# Patient Record
Sex: MALE | Race: WHITE | NOT HISPANIC OR LATINO | Employment: UNEMPLOYED | URBAN - NONMETROPOLITAN AREA
[De-identification: names, ages, dates, MRNs, and addresses within clinical notes are randomized per-mention and may not be internally consistent; named-entity substitution may affect disease eponyms.]

---

## 2018-10-13 PROCEDURE — 96372 THER/PROPH/DIAG INJ SC/IM: CPT

## 2018-10-13 PROCEDURE — 99283 EMERGENCY DEPT VISIT LOW MDM: CPT

## 2018-10-14 ENCOUNTER — HOSPITAL ENCOUNTER (EMERGENCY)
Facility: HOSPITAL | Age: 18
Discharge: HOME | End: 2018-10-14
Admitting: EMERGENCY MEDICINE

## 2018-10-14 VITALS
DIASTOLIC BLOOD PRESSURE: 76 MMHG | HEART RATE: 67 BPM | TEMPERATURE: 97.4 F | BODY MASS INDEX: 27.46 KG/M2 | RESPIRATION RATE: 16 BRPM | SYSTOLIC BLOOD PRESSURE: 132 MMHG | WEIGHT: 155 LBS | OXYGEN SATURATION: 100 % | HEIGHT: 63 IN

## 2018-10-14 DIAGNOSIS — L03.317 CELLULITIS AND ABSCESS OF BUTTOCK: ICD-10-CM

## 2018-10-14 DIAGNOSIS — L02.31 CELLULITIS AND ABSCESS OF BUTTOCK: ICD-10-CM

## 2018-10-14 DIAGNOSIS — Z20.2 POSSIBLE EXPOSURE TO STD: Primary | ICD-10-CM

## 2018-10-14 LAB
BILIRUB UR QL STRIP: NEGATIVE
CLARITY UR: CLEAR
COLOR UR: YELLOW
GLUCOSE UR STRIP-MCNC: NEGATIVE MG/DL
HGB UR QL STRIP.AUTO: NEGATIVE
KETONES UR QL STRIP: NEGATIVE
LEUKOCYTE ESTERASE UR QL STRIP.AUTO: NEGATIVE
NITRITE UR QL STRIP: NEGATIVE
PH UR STRIP.AUTO: 6.5 [PH] (ref 5–8)
PROT UR QL STRIP: NEGATIVE
SP GR UR STRIP: 1.02 (ref 1–1.03)
UROBILINOGEN UR QL STRIP: NORMAL

## 2018-10-14 PROCEDURE — 96372 THER/PROPH/DIAG INJ SC/IM: CPT

## 2018-10-14 PROCEDURE — 87591 N.GONORRHOEAE DNA AMP PROB: CPT | Performed by: PHYSICIAN ASSISTANT

## 2018-10-14 PROCEDURE — 87491 CHLMYD TRACH DNA AMP PROBE: CPT | Performed by: PHYSICIAN ASSISTANT

## 2018-10-14 PROCEDURE — 25010000002 CEFTRIAXONE PER 250 MG: Performed by: PHYSICIAN ASSISTANT

## 2018-10-14 PROCEDURE — 81003 URINALYSIS AUTO W/O SCOPE: CPT | Performed by: PHYSICIAN ASSISTANT

## 2018-10-14 RX ORDER — AZITHROMYCIN 250 MG/1
1000 TABLET, FILM COATED ORAL ONCE
Status: COMPLETED | OUTPATIENT
Start: 2018-10-14 | End: 2018-10-14

## 2018-10-14 RX ORDER — CEPHALEXIN 500 MG/1
500 CAPSULE ORAL 2 TIMES DAILY
Qty: 14 CAPSULE | Refills: 0 | Status: SHIPPED | OUTPATIENT
Start: 2018-10-14 | End: 2018-10-21

## 2018-10-14 RX ADMIN — LIDOCAINE HYDROCHLORIDE 1000 MG: 10 INJECTION, SOLUTION INFILTRATION; PERINEURAL at 01:46

## 2018-10-14 RX ADMIN — AZITHROMYCIN 1000 MG: 250 TABLET, FILM COATED ORAL at 01:43

## 2018-10-14 NOTE — ED PROVIDER NOTES
"Subjective   18-year-old male presents to the emergency department with dysuria, lesion to right buttocks.  Patient reports that he has had dysuria described as \"razor blade burning while I pedis\" for approximately 10 days.  Patient denies fever, chills, penile discharge.  However patient does state that he has had possible STD exposure due to unprotected sex.  Patient requesting retesting her SCDs.  Patient also states that he has a skin lesion on his right buttock area.  Patient reports that that has been present for 2-3 days.  She denies drainage, red streaking however states the area is tender and has slightly grown since onset.  Patient also complains of this time.            Review of Systems   Constitutional: Negative for chills, diaphoresis, fatigue and fever.   HENT: Negative for congestion and trouble swallowing.    Respiratory: Negative for shortness of breath and wheezing.    Cardiovascular: Negative for chest pain and palpitations.   Gastrointestinal: Negative for abdominal pain, diarrhea and nausea.   Genitourinary: Positive for dysuria.   Musculoskeletal: Negative for arthralgias and myalgias.   Skin: Positive for rash.   Neurological: Negative for dizziness and numbness.   Hematological: Negative for adenopathy. Does not bruise/bleed easily.       Past Medical History:   Diagnosis Date   • Concussion        No Known Allergies    History reviewed. No pertinent surgical history.    Family History   Problem Relation Age of Onset   • Diabetes Mother    • Kidney disease Mother    • Hypertension Mother    • Diabetes Maternal Aunt    • Hypertension Maternal Aunt    • Diabetes Maternal Grandmother    • Hypertension Maternal Grandmother        Social History     Social History   • Marital status: Single     Social History Main Topics   • Smoking status: Current Every Day Smoker     Types: Cigarettes   • Alcohol use No   • Drug use: Unknown     Other Topics Concern   • Not on file       Lab Results (last 24 " hours)     Procedure Component Value Units Date/Time    Urinalysis With Microscopic If Indicated (No Culture) - Urine, Clean Catch [[id number]]  (Normal) Collected:  10/14/18 0106    Specimen:  Urine from Urine, Clean Catch Updated:  10/14/18 0118     Color, UA Yellow     Appearance, UA Clear     pH, UA 6.5     Specific Gravity, UA 1.017     Glucose, UA Negative     Ketones, UA Negative     Bilirubin, UA Negative     Blood, UA Negative     Protein, UA Negative     Leuk Esterase, UA Negative     Nitrite, UA Negative     Urobilinogen, UA 0.2 E.U./dL    Narrative:       Urine microscopic not indicated.    Chlamydia trachomatis, Neisseria gonorrhoeae, PCR - Urine, Urine, Clean Catch [[id number]] Collected:  10/14/18 0106    Specimen:  Urine from Urine, Clean Catch Updated:  10/14/18 0112          Objective   Physical Exam   Constitutional: He is oriented to person, place, and time. He appears well-developed and well-nourished. No distress.   HENT:   Head: Normocephalic and atraumatic.   Right Ear: External ear normal.   Left Ear: External ear normal.   Eyes: Pupils are equal, round, and reactive to light. Conjunctivae and EOM are normal. Right eye exhibits no discharge. Left eye exhibits no discharge. No scleral icterus.   Neck: Normal range of motion. Neck supple. No tracheal deviation present. No thyromegaly present.   Cardiovascular: Normal rate, regular rhythm, normal heart sounds and intact distal pulses.  Exam reveals no friction rub.    No murmur heard.  Pulmonary/Chest: Effort normal and breath sounds normal. No respiratory distress. He has no wheezes.   Abdominal: Soft. Bowel sounds are normal. He exhibits no distension. There is no tenderness. There is no guarding.   Genitourinary: Testes normal and penis normal. Cremasteric reflex is present. Right testis shows no mass, no swelling and no tenderness. Left testis shows no mass, no swelling and no tenderness. Circumcised. No phimosis, paraphimosis, hypospadias,  "penile erythema or penile tenderness. No discharge found.   Genitourinary Comments: On penile exam accompanied by nurse.  No obvious discharge normal meatus and urethral opening, no lesions to the shaft or the testicles.  He does have razor burn to the pubic area.   Neurological: He is alert and oriented to person, place, and time.   Skin: Skin is warm and dry. Capillary refill takes less than 2 seconds. He is not diaphoretic.   Patient with small area of cellulitis on the right buttocks.  Partially one by 2 cm area well to the right of the gluteal cleft.  No abscess or areas of fluctuance or induration.  No red streaking.  Mild in nature.   Psychiatric: He has a normal mood and affect. His behavior is normal.   Nursing note and vitals reviewed.      Procedures         No orders to display       /76   Pulse 67   Temp 97.4 °F (36.3 °C) (Temporal Artery )   Resp 16   Ht 160 cm (63\")   Wt 70.3 kg (155 lb)   SpO2 100%   BMI 27.46 kg/m²     ED Course         Medications   cefTRIAXone (ROCEPHIN) 1,000 mg in lidocaine (XYLOCAINE) 1 % IM only syringe (1,000 mg Intramuscular Given 10/14/18 0146)   azithromycin (ZITHROMAX) tablet 1,000 mg (1,000 mg Oral Given 10/14/18 0143)            MDM  Number of Diagnoses or Management Options  Cellulitis and abscess of buttock: new and requires workup  Possible exposure to STD: new and requires workup  Diagnosis management comments: Patient with cellulitis and possible STD exposure patient will be treated prophylactically with ceftriaxone and azithromycin.  We will send home on Keflex for mildd subtle cellulitis on buttocks.  Patient is no acute distress, with normal vital signs.  Patient instructed to refrain from sexual intercourse until all symptoms have resolved and we will contact him if test results from PCR chlamydia, or gonorrhea are positive.le to plan.  All results discussed in all questions answered.  Strict return precautions to which patient is agreeable to.     "   Amount and/or Complexity of Data Reviewed  Clinical lab tests: reviewed and ordered    Risk of Complications, Morbidity, and/or Mortality  Presenting problems: low  Diagnostic procedures: low  Management options: low    Patient Progress  Patient progress: stable      Final diagnoses:   Possible exposure to STD   Cellulitis and abscess of buttock          Patel Joe PA-C  10/14/18 0321

## 2018-10-14 NOTE — DISCHARGE INSTRUCTIONS
Please take the antibiotics as prescribed for the full course of duration.  Please refrain from sexual intercourse until all symptoms have cleared.  Return to the emergency department if he developed any new, worsening or other concerning symptoms.    Follow up with one of the [facility] physician groups below to setup primary care. If you have trouble following up, please call the [facility] Nurse Line at [phone]    (Dr. Arik Rivera DO,  LATOYA Magana, and LATOYA Montano)  [facility], Primary Care   [address] [place] [address], [address], [place] [address] [phone]     (Dr. Martha Torres MD, LATOYA Dela Cruz, and LATOYA Ahmadi)  [facility], Primary Care   [address] [place] [id number] [phone]    (Dr. Ovi Reddy MD and Dr. Maximilian Bravo MD)  [facility], Primary Care  [address], [id number] [phone]    (Dr. Yahir Lanier MD)  [facility], Primary Care  [address], [place], [place], [address] [phone]

## 2018-10-17 LAB
C TRACH RRNA SPEC DONR QL NAA+PROBE: NEGATIVE
N GONORRHOEA DNA SPEC QL NAA+PROBE: NEGATIVE

## 2025-04-17 ENCOUNTER — OFFICE VISIT (OUTPATIENT)
Age: 25
End: 2025-04-17

## 2025-04-17 VITALS — HEIGHT: 65 IN | WEIGHT: 207.8 LBS | BODY MASS INDEX: 34.62 KG/M2

## 2025-04-17 DIAGNOSIS — M67.911 TENDINOPATHY OF RIGHT ROTATOR CUFF: ICD-10-CM

## 2025-04-17 DIAGNOSIS — M75.21 BICEPS TENDINITIS, RIGHT: ICD-10-CM

## 2025-04-17 DIAGNOSIS — M25.511 RIGHT SHOULDER PAIN, UNSPECIFIED CHRONICITY: Primary | ICD-10-CM

## 2025-04-17 RX ORDER — NABUMETONE 500 MG/1
500 TABLET, FILM COATED ORAL 2 TIMES DAILY
Qty: 60 TABLET | Refills: 0 | Status: SHIPPED | OUTPATIENT
Start: 2025-04-17

## 2025-04-17 RX ORDER — BETAMETHASONE SODIUM PHOSPHATE AND BETAMETHASONE ACETATE 3; 3 MG/ML; MG/ML
6 INJECTION, SUSPENSION INTRA-ARTICULAR; INTRALESIONAL; INTRAMUSCULAR; SOFT TISSUE ONCE
Status: COMPLETED | OUTPATIENT
Start: 2025-04-17 | End: 2025-04-17

## 2025-04-17 RX ORDER — LIDOCAINE HYDROCHLORIDE 10 MG/ML
1 INJECTION, SOLUTION INFILTRATION; PERINEURAL ONCE
Status: COMPLETED | OUTPATIENT
Start: 2025-04-17 | End: 2025-04-17

## 2025-04-17 RX ORDER — BUPIVACAINE HYDROCHLORIDE 5 MG/ML
3 INJECTION, SOLUTION EPIDURAL; INTRACAUDAL; PERINEURAL ONCE
Status: COMPLETED | OUTPATIENT
Start: 2025-04-17 | End: 2025-04-17

## 2025-04-17 RX ADMIN — BUPIVACAINE HYDROCHLORIDE 15 MG: 5 INJECTION, SOLUTION EPIDURAL; INTRACAUDAL; PERINEURAL at 15:54

## 2025-04-17 RX ADMIN — LIDOCAINE HYDROCHLORIDE 1 ML: 10 INJECTION, SOLUTION INFILTRATION; PERINEURAL at 15:55

## 2025-04-17 RX ADMIN — BETAMETHASONE SODIUM PHOSPHATE AND BETAMETHASONE ACETATE 6 MG: 3; 3 INJECTION, SUSPENSION INTRA-ARTICULAR; INTRALESIONAL; INTRAMUSCULAR; SOFT TISSUE at 15:53

## 2025-04-17 RX ADMIN — LIDOCAINE HYDROCHLORIDE 1 ML: 10 INJECTION, SOLUTION INFILTRATION; PERINEURAL at 15:54

## 2025-04-17 NOTE — PROGRESS NOTES
Orthopaedic History and Physical - New Patient    NAME:  Jay Amin   : 2000  MRN: [id number]      2025     CHIEF COMPLAINT: Right shoulder pain    HISTORY OF PRESENT ILLNESS: This is a pleasant 24-year-old gentleman comes in as a new patient for evaluation of right shoulder pain.  Patient reports history of right shoulder issues.  States he recently he was pushing a box under the bear went at sending him backwards to try to catch it with his right arm pushing his right arm backwards.  Patient felt a pop during this time.  Patient's times had moderate pain and complaints of weakness in the arm.  For like an electric shock at times.  He went to ER over [place] which gave him an IM steroid.  This gave him minimal relief.  Patient denies any bruising or swelling.  Pain is intermittent, sharp, moderate.  He is here for further evaluation treatment options.    Past Medical History:        Diagnosis Date    ADHD (attention deficit hyperactivity disorder)     Managed by Dr Gold 4rivers       Past Surgical History:        Procedure Laterality Date    CIRCUMCISION         Current Medications:   Prior to Admission medications    Medication Sig Start Date End Date Taking? Authorizing Provider   methylphenidate (CONCERTA) 27 MG CR tablet Take 27 mg by mouth every morning.    Patient not taking: Reported on 2025    Provider, MD Deidra       Allergies:  Patient has no known allergies.    Social History:   Social History     Socioeconomic History    Marital status: Single     Spouse name: Not on file    Number of children: Not on file    Years of education: Not on file    Highest education level: Not on file   Occupational History    Not on file   Tobacco Use    Smoking status: Never    Smokeless tobacco: Never   Substance and Sexual Activity    Alcohol use: Not on file    Drug use: Not on file    Sexual activity: Not on file   Other Topics Concern    Not on file   Social History Narrative    Not on file